# Patient Record
Sex: FEMALE | Race: BLACK OR AFRICAN AMERICAN | Employment: UNEMPLOYED | URBAN - METROPOLITAN AREA
[De-identification: names, ages, dates, MRNs, and addresses within clinical notes are randomized per-mention and may not be internally consistent; named-entity substitution may affect disease eponyms.]

---

## 2018-09-20 ENCOUNTER — OFFICE VISIT (OUTPATIENT)
Dept: ORTHOPEDIC SURGERY | Facility: CLINIC | Age: 28
End: 2018-09-20

## 2018-09-20 VITALS
BODY MASS INDEX: 35.55 KG/M2 | HEART RATE: 69 BPM | OXYGEN SATURATION: 99 % | WEIGHT: 221.2 LBS | TEMPERATURE: 97.1 F | DIASTOLIC BLOOD PRESSURE: 66 MMHG | HEIGHT: 66 IN | SYSTOLIC BLOOD PRESSURE: 132 MMHG | RESPIRATION RATE: 16 BRPM

## 2018-09-20 DIAGNOSIS — M79.642 PAIN IN BOTH HANDS: ICD-10-CM

## 2018-09-20 DIAGNOSIS — M79.641 PAIN IN BOTH HANDS: ICD-10-CM

## 2018-09-20 DIAGNOSIS — M65.4 DE QUERVAIN'S TENOSYNOVITIS, LEFT: Primary | ICD-10-CM

## 2018-09-20 DIAGNOSIS — M65.4 DE QUERVAIN'S TENOSYNOVITIS, RIGHT: ICD-10-CM

## 2018-09-20 PROBLEM — E66.01 SEVERE OBESITY (BMI 35.0-39.9): Status: ACTIVE | Noted: 2018-09-20

## 2018-09-20 RX ORDER — BETAMETHASONE SODIUM PHOSPHATE AND BETAMETHASONE ACETATE 3; 3 MG/ML; MG/ML
6 INJECTION, SUSPENSION INTRA-ARTICULAR; INTRALESIONAL; INTRAMUSCULAR; SOFT TISSUE ONCE
Qty: 0.5 ML | Refills: 0
Start: 2018-09-20 | End: 2018-09-20

## 2018-09-20 RX ORDER — BUPIVACAINE HYDROCHLORIDE 2.5 MG/ML
0.5 INJECTION, SOLUTION EPIDURAL; INFILTRATION; INTRACAUDAL ONCE
Qty: 0.5 ML | Refills: 0
Start: 2018-09-20 | End: 2018-09-20

## 2018-09-20 NOTE — MR AVS SNAPSHOT
[address] [facility] 
 
 
 [address], [address] [facility] [address] 
[phone] Patient: Estefania Hernandez MRN: [id number] YWX: Visit Information Date & Time Provider Department Dept. Phone Encounter #  
 2018  2:30 PM Brian Durham MD [address] [facility] [id number] [id number] Follow-up Instructions Return if symptoms worsen or fail to improve. Your Appointments 2018  2:30 PM  
New Patient with Brian Durham MD  
[address] [facility] [facility] [address]) Appt Note: BILATERAL HAND PAIN NX [facility]/uses [employer] transportation needs 5 days); URGENT APPT (pt unable to hold child/); PT RS FOR THIS NEW DATE AND TIME  
 [address], [address] [facility] [address]  
[phone]  
  
   
 [address] [place], [place] [address] [id number] Upcoming Health Maintenance Date Due  
 HPV Age 9Y-34Y (1 of 1 - Female 3 Dose Series) 11/15/2003 DTaP/Tdap/Td series (1 - Tdap) 11/15/2013 PAP AKA CERVICAL CYTOLOGY 11/15/2013 Influenza Age 5 to Adult 2018 Allergies as of 2018  Review Complete On: 2018 By: Laila Piedra Not on File Current Immunizations  Never Reviewed No immunizations on file. Not reviewed this visit You Were Diagnosed With   
  
 Codes Comments De Quervain's tenosynovitis, left    -  Primary ICD-10-CM: M65.4 ICD-9-CM: 727.04 De Quervain's tenosynovitis, right     ICD-10-CM: M65.4 ICD-9-CM: 727.04 Pain in both hands     ICD-10-CM: M79.641, M68.054 ICD-9-CM: 729.5 Vitals BP Pulse Temp Resp Height(growth percentile) Weight(growth percentile) 132/66 69 97.1 °F (36.2 °C) (Oral) 16 5' 6\" (1.676 m) 221 lb 3.2 oz (100.3 kg) SpO2 BMI OB Status Smoking Status 99% 35.7 kg/m2 Having regular periods Never Smoker BMI and BSA Data Body Mass Index Body Surface Area 35.7 kg/m 2 2.16 m 2 Your Updated Medication List  
  
Notice  As of 9/20/2018  2:05 PM  
 You have not been prescribed any medications. Follow-up Instructions Return if symptoms worsen or fail to improve. Patient Instructions Jenn Villanueva Tenosynovitis: Care Instructions Your Care Instructions Jenn Villanueva (say \"Alyson\") tenosynovitis is a problem that makes the bottom of your thumb and the side of your wrist hurt. When you have de Quervain's, the ropey fiber (tendon) that helps move your thumb away from your fingers becomes swollen. You may have pain when you move your wrist or pick things up. You may hear a creaking sound when you move your wrist or thumb. Symptoms often get better in a few weeks with home care. Your doctor may want you to start some gentle stretching exercises once your symptoms are gone. Sometimes treatment with an injection or surgery is needed. Follow-up care is a key part of your treatment and safety. Be sure to make and go to all appointments, and call your doctor if you are having problems. It's also a good idea to know your test results and keep a list of the medicines you take. How can you care for yourself at home? · Until your symptoms are better, stop the activities that caused the pain. · Avoid moving the hand and wrist that hurt. · Follow your doctor's directions for wearing a splint to keep your thumb and wrist from moving. · Try ice or heat. ¨ Put ice or a cold pack on your thumb and wrist for 10 to 20 minutes at a time. Put a thin cloth between the ice and your skin. ¨ You can use heat for 20 to 30 minutes, 2 or 3 times a day. Try using a heating pad, hot shower, or hot pack. · Ask your doctor if you can take an over-the-counter pain medicine, such as acetaminophen (Tylenol), ibuprofen (Advil, Motrin), or naproxen (Aleve). Be safe with medicines. Read and follow all instructions on the label. When should you call for help? Watch closely for changes in your health, and be sure to contact your doctor if: 
  · You have new or worse pain.  
  · You have new or worse numbness or tingling in your hand or fingers.  
  · Your hand feels weaker.  
  · You do not get better as expected. Where can you learn more? Go to http://marii-yanira.info/. Enter U947 in the search box to learn more about \"De Quervain's Tenosynovitis: Care Instructions. \" Current as of: November 29, 2017 Content Version: 11.7 © 9792-2182 vushaper. Care instructions adapted under license by [employer] (which disclaims liability or warranty for this information). If you have questions about a medical condition or this instruction, always ask your healthcare professional. [address] any warranty or liability for your use of this information. Jagjit Nearing Disease: Exercises Your Care Instructions Here are some examples of typical rehabilitation exercises for your condition. Start each exercise slowly. Ease off the exercise if you start to have pain. Your doctor or your physical or occupational therapist will tell you when you can start these exercises and which ones will work best for you. How to do the exercises Thumb lifts 1. Place your hand on a flat surface, with your palm up. 2. Lift your thumb away from your palm to make a \"C\" shape. 3. Hold for about 6 seconds. 4. Repeat 8 to 12 times. Passive thumb MP flexion 1. Hold your hand in front of you, and turn your hand so your little finger faces down and your thumb faces up. (Your hand should be in the position used for shaking someone's hand.) You may also rest your hand on a flat surface. 2. Use the fingers on your other hand to bend your thumb down at the point where your thumb connects to your palm. 3. Hold for at least 15 to 30 seconds. 4. Repeat 2 to 4 times. Finkelstein stretch 1. Hold your arms out in front of you. (Your hand should be in the position used for shaking someone's hand.) 2. Bend your thumb toward your palm. 3. Use your other hand to gently stretch your thumb and wrist downward until you feel the stretch on the thumb side of your wrist. 
4. Hold for at least 15 to 30 seconds. 5. Repeat 2 to 4 times. Resisted ulnar deviation For this exercise, you will need elastic exercise material, such as surgical tubing or Thera-Band. 1. Sit leaning forward with your legs slightly spread and your elbow on your thigh. 2. Grasp one end of the band with your palm down, and step on the other end with the foot opposite the hand holding the band. 3. Slowly bend your wrist sideways and away from your knee. 4. Repeat 8 to 12 times. Follow-up care is a key part of your treatment and safety. Be sure to make and go to all appointments, and call your doctor if you are having problems. It's also a good idea to know your test results and keep a list of the medicines you take. Where can you learn more? Go to http://marii-yanira.info/. Enter F065 in the search box to learn more about \"De Quervain's Disease: Exercises. \" Current as of: November 29, 2017 Content Version: 11.7 © 3977-7134 [employer]. Care instructions adapted under license by [employer] (which disclaims liability or warranty for this information). If you have questions about a medical condition or this instruction, always ask your healthcare professional. [place] [address] any warranty or liability for your use of this information. Introducing [facility] & [facility]! Brittani Gaspar introduces CertificationPoint patient portal. Now you can access parts of your medical record, email your doctor's office, and request medication refills online. 1. In your internet browser, go to [email or web address] 2. Click on the First Time User? Click Here link in the Sign In box. You will see the New Member Sign Up page. 3. Enter your ClaimKit Access Code exactly as it appears below. You will not need to use this code after youve completed the sign-up process. If you do not sign up before the expiration date, you must request a new code. · ClaimKit Access Code: [email or web address] Expires: 12/19/2018  2:05 PM 
 
4. Enter the last four digits of your Social Security Number (xxxx) and Date of Birth (mm/dd/yyyy) as indicated and click Submit. You will be taken to the next sign-up page. 5. Create a ClaimKit ID. This will be your ClaimKit login ID and cannot be changed, so think of one that is secure and easy to remember. 6. Create a ClaimKit password. You can change your password at any time. 7. Enter your Password Reset Question and Answer. This can be used at a later time if you forget your password. 8. Enter your e-mail address. You will receive e-mail notification when new information is available in [address] 9. Click Sign Up. You can now view and download portions of your medical record. 10. Click the Download Summary menu link to download a portable copy of your medical information. If you have questions, please visit the Frequently Asked Questions section of the ClaimKit website. Remember, ClaimKit is NOT to be used for urgent needs. For medical emergencies, dial 911. Now available from your iPhone and Android! Please provide this summary of care documentation to your next provider. If you have any questions after today's visit, please call [phone].

## 2018-09-20 NOTE — PATIENT INSTRUCTIONS
Sheree Mays Tenosynovitis: Care Instructions Your Care Instructions Sheree Mays (say \"Alyson\") tenosynovitis is a problem that makes the bottom of your thumb and the side of your wrist hurt. When you have de Quervain's, the ropey fiber (tendon) that helps move your thumb away from your fingers becomes swollen. You may have pain when you move your wrist or pick things up. You may hear a creaking sound when you move your wrist or thumb. Symptoms often get better in a few weeks with home care. Your doctor may want you to start some gentle stretching exercises once your symptoms are gone. Sometimes treatment with an injection or surgery is needed. Follow-up care is a key part of your treatment and safety. Be sure to make and go to all appointments, and call your doctor if you are having problems. It's also a good idea to know your test results and keep a list of the medicines you take. How can you care for yourself at home? · Until your symptoms are better, stop the activities that caused the pain. · Avoid moving the hand and wrist that hurt. · Follow your doctor's directions for wearing a splint to keep your thumb and wrist from moving. · Try ice or heat. ¨ Put ice or a cold pack on your thumb and wrist for 10 to 20 minutes at a time. Put a thin cloth between the ice and your skin. ¨ You can use heat for 20 to 30 minutes, 2 or 3 times a day. Try using a heating pad, hot shower, or hot pack. · Ask your doctor if you can take an over-the-counter pain medicine, such as acetaminophen (Tylenol), ibuprofen (Advil, Motrin), or naproxen (Aleve). Be safe with medicines. Read and follow all instructions on the label. When should you call for help? Watch closely for changes in your health, and be sure to contact your doctor if: 
  · You have new or worse pain.  
  · You have new or worse numbness or tingling in your hand or fingers.  
  · Your hand feels weaker.   · You do not get better as expected. Where can you learn more? Go to http://marii-yanira.info/. Enter S862 in the search box to learn more about \"De Quervain's Tenosynovitis: Care Instructions. \" Current as of: November 29, 2017 Content Version: 11.7 © 1545-8489 Scards. Care instructions adapted under license by [employer] (which disclaims liability or warranty for this information). If you have questions about a medical condition or this instruction, always ask your healthcare professional. [address] any warranty or liability for your use of this information. Thelma Mullins Disease: Exercises Your Care Instructions Here are some examples of typical rehabilitation exercises for your condition. Start each exercise slowly. Ease off the exercise if you start to have pain. Your doctor or your physical or occupational therapist will tell you when you can start these exercises and which ones will work best for you. How to do the exercises Thumb lifts 1. Place your hand on a flat surface, with your palm up. 2. Lift your thumb away from your palm to make a \"C\" shape. 3. Hold for about 6 seconds. 4. Repeat 8 to 12 times. Passive thumb MP flexion 1. Hold your hand in front of you, and turn your hand so your little finger faces down and your thumb faces up. (Your hand should be in the position used for shaking someone's hand.) You may also rest your hand on a flat surface. 2. Use the fingers on your other hand to bend your thumb down at the point where your thumb connects to your palm. 3. Hold for at least 15 to 30 seconds. 4. Repeat 2 to 4 times. Finkelstein stretch 1. Hold your arms out in front of you. (Your hand should be in the position used for shaking someone's hand.) 2. Bend your thumb toward your palm.  
3. Use your other hand to gently stretch your thumb and wrist downward until you feel the stretch on the thumb side of your wrist. 
4. Hold for at least 15 to 30 seconds. 5. Repeat 2 to 4 times. Resisted ulnar deviation For this exercise, you will need elastic exercise material, such as surgical tubing or Thera-Band. 1. Sit leaning forward with your legs slightly spread and your elbow on your thigh. 2. Grasp one end of the band with your palm down, and step on the other end with the foot opposite the hand holding the band. 3. Slowly bend your wrist sideways and away from your knee. 4. Repeat 8 to 12 times. Follow-up care is a key part of your treatment and safety. Be sure to make and go to all appointments, and call your doctor if you are having problems. It's also a good idea to know your test results and keep a list of the medicines you take. Where can you learn more? Go to http://marii-yanira.info/. Enter V023 in the search box to learn more about \"De Quervain's Disease: Exercises. \" Current as of: November 29, 2017 Content Version: 11.7 © 0721-3658 [employer]Page, Incorporated. Care instructions adapted under license by [employer] (which disclaims liability or warranty for this information). If you have questions about a medical condition or this instruction, always ask your healthcare professional. [address] any warranty or liability for your use of this information.

## 2018-09-20 NOTE — PROGRESS NOTES
Patient: Samara Urias                MRN: [id number]       SSN: [id number] YOB: 1990        AGE: 24 y.o. SEX: female PCP: No primary care provider on file. 09/20/18 Chief Complaint Patient presents with  
 Hand Pain RANDI HAND PAIN  
 
HISTORY:  Samara Urias is a 24 y.o. female who is seen for bilateral hand pain, numbness, and tingling. She reports a pulling sensation over her first extensor compartments of both of her hands. She reports that she recently delivered a baby. She believed that the nurses who put in her IV while she was in the hospital delivering her baby dug into her wrists. She reports she has been picking up the child a lot and using her hands more often recently. She reports difficulty with doing everyday activities due to her hand pains. She reports she has been pushing with her elbows instead of her hands due to her pains. She has a feeling of weakness in her left arm. She reports no h/o injury. She reports her pain started since she delivered her baby 5 months ago. Pain Assessment  9/20/2018 Location of Pain Hand;Wrist  
Location Modifiers Left;Right Severity of Pain 10 Quality of Pain Sharp; Aching; Throbbing Quality of Pain Comment PT STATES SHE HAS A PULLING FEELING & WEAKNESS IN ARMS Duration of Pain Persistent Frequency of Pain Constant Aggravating Factors Other (Comment) Aggravating Factors Comment MOVEMENTS OF FINGERS, CERTAIN MOVEMENTS, PICKING UP BABY, WHILE AT WORK Limiting Behavior Yes Relieving Factors Nothing Result of Injury Yes Work-Related Injury No  
Type of Injury Other (Comment) Type of Injury Comment R/T IV INSERTION DURING GIVING CHILD BIRTH Occupation, etc:  Ms. Tramaine Pate is a  at [employer]. She moved to  from [place] just over 1 year ago. She has an associates degree in Traction and is thinking of going back for her bachelor's degree.   She has a baby daughter and a 7yo son at home. Current weight is 221 pounds. She is 5'6\" tall. No results found for: HBA1C, HGBE8, ZCU7LXPC, IFT6AYEB, VHJ3OJOI Weight Metrics 9/20/2018 Weight 221 lb 3.2 oz  
BMI 35.7 kg/m2 There is no problem list on file for this patient. REVIEW OF SYSTEMS: All Below are Negative except: See HPI Constitutional: negative for fever, chills, and weight loss. Cardiovascular: negative for chest pain, claudication, leg swelling, SOB, SNELL Gastrointestinal: Negative for pain, N/V/C/D, Blood in stool or urine, dysuria,  hematuria, incontinence, pelvic pain. Musculoskeletal: See HPI Neurological: Negative for dizziness and weakness. Negative for headaches, Visual changes, confusion, seizures Phychiatric/Behavioral: Negative for depression, memory loss, substance  abuse. Extremities: Negative for hair changes, rash, or skin lesion changes. Hematologic: Negative for bleeding problems, bruising, pallor or swollen lymph  nodes Peripheral Vascular: No calf pain, no circulation deficits. Social History Social History  Marital status: SINGLE Spouse name: N/A  
 Number of children: N/A  
 Years of education: N/A Occupational History  Not on file. Social History Main Topics  Smoking status: Never Smoker  Smokeless tobacco: Never Used  Alcohol use Not on file  Drug use: Not on file  Sexual activity: Not on file Other Topics Concern  Not on file Social History Narrative  No narrative on file Not on File No current outpatient prescriptions on file. No current facility-administered medications for this visit. PHYSICAL EXAMINATION: 
Visit Vitals  /66  Pulse 69  Temp 97.1 °F (36.2 °C) (Oral)  Resp 16  
 Ht 5' 6\" (1.676 m)  Wt 221 lb 3.2 oz (100.3 kg)  SpO2 99%  BMI 35.7 kg/m2 ORTHO EXAMINATION: 
Examination Right Wrist Left Wrist  
Skin Intact Intact Tenderness + radial + radial  
Flexion 30 30 Extension 30 30 Deformity - - Effusion - - Finger flexion Full Full Finger extension Full Full Tinel's sign - - Phalen's test - - Finklestein maneuver + + Pain with thumb abduction + + Capillary refill - - PROCEDURE:  After discussing treatment options, patient's left first extensor compartment was injected with 1/2 cc Marcaine and 1/2 cc Celestone. Chart reviewed for the following: 
 Jerrod Tsai MD, have reviewed the History, Physical and updated the Allergic reactions for [facility] TIME OUT performed immediately prior to start of procedure: 
Jerrod Tsai MD, have performed the following reviews on [facility] prior to the start of the procedure: 
         
* Patient was identified by name and date of birth * Agreement on procedure being performed was verified * Risks and Benefits explained to the patient * Procedure site verified and marked as necessary * Patient was positioned for comfort * Consent was obtained Time: 1:59 PM  
 
Date of procedure: 9/20/2018 Procedure performed by:  Anna Marie Ricci MD 
 
Ms. Shankar tolerated the procedure well with no complications. IMPRESSION:   
  ICD-10-CM ICD-9-CM 1. De Quervain's tenosynovitis, left M65.4 727.04   
2. De Quervain's tenosynovitis, right M65.4 727.04   
3. Pain in both hands M79.641 729.5   
 M79.642 PLAN:   After discussing treatment options, patient's left first extensor compartment was injected with 1/2 cc Marcaine and 1/2 cc Celestone. Left thumb spica splint applied. She will follow up as needed. We discussed possible need for De Quervain's release at some time in the future if pain continues.  
 
Scribed by Marie Cee ([facility]) as dictated by Anna Marie Ricci MD

## 2019-01-15 ENCOUNTER — OFFICE VISIT (OUTPATIENT)
Dept: ORTHOPEDIC SURGERY | Age: 29
End: 2019-01-15

## 2019-01-15 VITALS
RESPIRATION RATE: 16 BRPM | TEMPERATURE: 98.2 F | DIASTOLIC BLOOD PRESSURE: 64 MMHG | BODY MASS INDEX: 37.93 KG/M2 | WEIGHT: 236 LBS | OXYGEN SATURATION: 99 % | SYSTOLIC BLOOD PRESSURE: 132 MMHG | HEART RATE: 80 BPM | HEIGHT: 66 IN

## 2019-01-15 DIAGNOSIS — M65.4 DE QUERVAIN'S TENOSYNOVITIS, RIGHT: ICD-10-CM

## 2019-01-15 DIAGNOSIS — M79.641 PAIN IN BOTH HANDS: ICD-10-CM

## 2019-01-15 DIAGNOSIS — M65.4 DE QUERVAIN'S TENOSYNOVITIS, LEFT: Primary | ICD-10-CM

## 2019-01-15 DIAGNOSIS — M79.642 PAIN IN BOTH HANDS: ICD-10-CM

## 2019-01-15 RX ORDER — BUPIVACAINE HYDROCHLORIDE 2.5 MG/ML
0.5 INJECTION, SOLUTION EPIDURAL; INFILTRATION; INTRACAUDAL ONCE
Qty: 0.5 ML | Refills: 0
Start: 2019-01-15 | End: 2019-01-15

## 2019-01-15 RX ORDER — BETAMETHASONE SODIUM PHOSPHATE AND BETAMETHASONE ACETATE 3; 3 MG/ML; MG/ML
6 INJECTION, SUSPENSION INTRA-ARTICULAR; INTRALESIONAL; INTRAMUSCULAR; SOFT TISSUE ONCE
Qty: 0.5 ML | Refills: 0
Start: 2019-01-15 | End: 2019-01-15

## 2019-01-15 NOTE — PATIENT INSTRUCTIONS
Tacos Snow Disease: Exercises Your Care Instructions Here are some examples of typical rehabilitation exercises for your condition. Start each exercise slowly. Ease off the exercise if you start to have pain. Your doctor or your physical or occupational therapist will tell you when you can start these exercises and which ones will work best for you. How to do the exercises Thumb lifts 1. Place your hand on a flat surface, with your palm up. 2. Lift your thumb away from your palm to make a \"C\" shape. 3. Hold for about 6 seconds. 4. Repeat 8 to 12 times. Passive thumb MP flexion 1. Hold your hand in front of you, and turn your hand so your little finger faces down and your thumb faces up. (Your hand should be in the position used for shaking someone's hand.) You may also rest your hand on a flat surface. 2. Use the fingers on your other hand to bend your thumb down at the point where your thumb connects to your palm. 3. Hold for at least 15 to 30 seconds. 4. Repeat 2 to 4 times. Finkelstein stretch 1. Hold your arms out in front of you. (Your hand should be in the position used for shaking someone's hand.) 2. Bend your thumb toward your palm. 3. Use your other hand to gently stretch your thumb and wrist downward until you feel the stretch on the thumb side of your wrist. 
4. Hold for at least 15 to 30 seconds. 5. Repeat 2 to 4 times. Resisted ulnar deviation 1. Sit leaning forward with your legs slightly spread and your elbow on your thigh. 2. Grasp one end of the band with your palm down, and step on the other end with the foot opposite the hand holding the band. 3. Slowly bend your wrist sideways and away from your knee. 4. Repeat 8 to 12 times. Follow-up care is a key part of your treatment and safety. Be sure to make and go to all appointments, and call your doctor if you are having problems.  It's also a good idea to know your test results and keep a list of the medicines you take. Where can you learn more? Go to http://marii-yanira.info/. Enter H702 in the search box to learn more about \"De Quervain's Disease: Exercises. \" Current as of: November 29, 2017 Content Version: 11.8 © 6701-5448 [employer]. Care instructions adapted under license by [employer] (which disclaims liability or warranty for this information). If you have questions about a medical condition or this instruction, always ask your healthcare professional. [address] any warranty or liability for your use of this information. Andres Ax Tendon Release: Before Your Surgery What is de Quervain's tendon release? De Quervain's tendon release is surgery to decrease pressure on a tendon that runs along the side of the wrist near the thumb. Tendons are flexible, ropelike fibers that connect muscle to bone. In de Quervain's (say \"ztu-aqms-HXPB\") tendinitis, the tendon becomes swollen. This causes the tendon to rub painfully against the tissue that covers it. This surgery will probably be done while you are awake. The doctor will give you a shot (injection) to numb your hand and prevent pain. You also may get medicine to help you relax. The doctor will make a cut (incision) in the skin on the side of your wrist near the base of your thumb. He or she will make a cut to open the tight band over the swollen part of the tendon. This will allow the tendon to move freely without pain. The doctor will close the skin incision with stitches. You will have a scar on the side of your wrist that will fade with time. You will go home on the same day as the surgery. How soon you can return to work depends on your job. If you can do your job without using your hand, you may be able to go back in 1 or 2 days.  But if your job requires you to do repeated hand or wrist movements, put pressure on your hand, or lift things, you may need 6 to 12 weeks off work. Follow-up care is a key part of your treatment and safety. Be sure to make and go to all appointments, and call your doctor if you are having problems. It's also a good idea to know your test results and keep a list of the medicines you take. What happens before surgery? 
 Surgery can be stressful. This information will help you understand what you can expect. And it will help you safely prepare for surgery. 
 Preparing for surgery 
  · Understand exactly what surgery is planned, along with the risks, benefits, and other options. · Tell your doctors ALL the medicines, vitamins, supplements, and herbal remedies you take. Some of these can increase the risk of bleeding or interact with anesthesia.  
  · If you take blood thinners, such as warfarin (Coumadin), clopidogrel (Plavix), or aspirin, be sure to talk to your doctor. He or she will tell you if you should stop taking these medicines before your surgery. Make sure that you understand exactly what your doctor wants you to do.  
  · Your doctor will tell you which medicines to take or stop before your surgery. You may need to stop taking certain medicines a week or more before surgery. So talk to your doctor as soon as you can.  
  · If you have an advance directive, let your doctor know. It may include a living will and a durable power of  for health care. Bring a copy to the hospital. If you don't have one, you may want to prepare one. It lets your doctor and loved ones know your health care wishes. Doctors advise that everyone prepare these papers before any type of surgery or procedure. What happens on the day of surgery? · Follow the instructions exactly about when to stop eating and drinking. If you don't, your surgery may be canceled. If your doctor told you to take your medicines on the day of surgery, take them with only a sip of water.   · Take a bath or shower before you come in for your surgery. Do not apply lotions, perfumes, deodorants, or nail polish.  
  · Do not shave the surgical site yourself.  
  · Take off all jewelry and piercings. And take out contact lenses, if you wear them.  
 At the hospital or surgery center · Bring a picture ID.  
  · The area for surgery is often marked to make sure there are no errors.  
  · You will be kept comfortable and safe by your anesthesia provider. You may get medicine that relaxes you or puts you in a light sleep. The area being worked on will be numb.  
  · The surgery will take about 30 minutes.  
  · After surgery, you will have a thick bandage on your hand and wrist. You may not be able to bend your wrist.  
  · You will probably go home after 1 hour in the recovery room. Going home · Be sure you have someone to drive you home. Anesthesia and pain medicine make it unsafe for you to drive.  
  · You will be given more specific instructions about recovering from your surgery. They will cover things like diet, wound care, follow-up care, driving, and getting back to your normal routine. When should you call your doctor? · You have questions or concerns.  
  · You don't understand how to prepare for your surgery.  
  · You become ill before the surgery (such as fever, flu, or a cold).  
  · You need to reschedule or have changed your mind about having the surgery. Where can you learn more? Go to http://marii-yanira.info/. Enter C153 in the search box to learn more about \"De Quervain's Tendon Release: Before Your Surgery. \" Current as of: November 29, 2017 Content Version: 11.8 © 8514-7078 [employer]. Care instructions adapted under license by [employer] (which disclaims liability or warranty for this information).  If you have questions about a medical condition or this instruction, always ask your healthcare professional. [employer] disclaims any warranty or liability for your use of this information. Elsa Reyes Tenosynovitis: Care Instructions Your Care Instructions Elsa Reyes (say \"Alyson\") tenosynovitis is a problem that makes the bottom of your thumb and the side of your wrist hurt. When you have de Quervain's, the ropey fiber (tendon) that helps move your thumb away from your fingers becomes swollen. You may have pain when you move your wrist or pick things up. You may hear a creaking sound when you move your wrist or thumb. Symptoms often get better in a few weeks with home care. Your doctor may want you to start some gentle stretching exercises once your symptoms are gone. Sometimes treatment with an injection or surgery is needed. Follow-up care is a key part of your treatment and safety. Be sure to make and go to all appointments, and call your doctor if you are having problems. It's also a good idea to know your test results and keep a list of the medicines you take. How can you care for yourself at home? · Until your symptoms are better, stop the activities that caused the pain. · Avoid moving the hand and wrist that hurt. · Follow your doctor's directions for wearing a splint to keep your thumb and wrist from moving. · Try ice or heat. ? Put ice or a cold pack on your thumb and wrist for 10 to 20 minutes at a time. Put a thin cloth between the ice and your skin. ? You can use heat for 20 to 30 minutes, 2 or 3 times a day. Try using a heating pad, hot shower, or hot pack. · Ask your doctor if you can take an over-the-counter pain medicine, such as acetaminophen (Tylenol), ibuprofen (Advil, Motrin), or naproxen (Aleve). Be safe with medicines. Read and follow all instructions on the label. When should you call for help? Watch closely for changes in your health, and be sure to contact your doctor if: 
  · You have new or worse pain.   · You have new or worse numbness or tingling in your hand or fingers.  
  · Your hand feels weaker.  
  · You do not get better as expected. Where can you learn more? Go to http://marii-yanira.info/. Enter J047 in the search box to learn more about \"De Quervain's Tenosynovitis: Care Instructions. \" Current as of: November 29, 2017 Content Version: 11.8 © 1848-8286 [employer]. Care instructions adapted under license by [employer] (which disclaims liability or warranty for this information). If you have questions about a medical condition or this instruction, always ask your healthcare professional. [address] any warranty or liability for your use of this information.

## 2019-01-15 NOTE — PROGRESS NOTES
Patient: Jaquelin Villalta                MRN: [id number]       SSN: [id number] YOB: 1990        AGE: 34 y.o. SEX: female PCP: No primary care provider on file. 01/15/19 Chief Complaint Patient presents with  
 Hand Pain  
  praful hand pain follow up HISTORY:  Jaquelin Villalta is a 34 y.o. female who is seen for bilateral radial wrist pains. She reports a pulling sensation over her first extensor compartments of both wrists. She reports that she recently delivered a baby. She states that the nurses who inserted her IV during delivery dug in too hard. She notes pain when picking up her baby son. She reports difficulty with doing everyday activities due to her wrist pains. She reports she has been pushing with her elbows instead of her hands due to her pains. She has a feeling of weakness especially in her left wrist and forearm. She reports no h/o injury. She feels as if her thumbs will pop out of place and get stuck. She has to pop back her thumbs back into place. She states that her pain feels as if someone is stabbing her. She has a shooting pain through her arm when her thumb pops. Pain Assessment  1/15/2019 Location of Pain Hand Location Modifiers Right Severity of Pain 9 Quality of Pain Aching Quality of Pain Comment - Duration of Pain Persistent Frequency of Pain Constant Aggravating Factors Bending;Stretching;Straightening Aggravating Factors Comment - Limiting Behavior Some Relieving Factors Rest  
Result of Injury - Work-Related Injury - Type of Injury - Type of Injury Comment -  
 
Occupation, etc:  Ms. Mary Anne Hopkins is a  at [employer]. She moved to  from [place] just over 1 year ago. She has an associates degree in [place] and is thinking of going back for her bachelor's degree. She has a 7mo son Brissa Arizmendi and 6yo son Donna Srinivasan at home. Current weight is 236 pounds. She is 5'6\" tall. No results found for: HBA1C, HGBE8, SEH5OSZW, ANF9AMOB, IEW6QTEM Weight Metrics 1/15/2019 9/20/2018 Weight 236 lb 221 lb 3.2 oz  
BMI 38.09 kg/m2 35.7 kg/m2 Patient Active Problem List  
Diagnosis Code  Severe obesity (BMI 35.0-39. 9) E66.01  
 
REVIEW OF SYSTEMS: All Below are Negative except: See HPI Constitutional: negative for fever, chills, and weight loss. Cardiovascular: negative for chest pain, claudication, leg swelling, SOB, SNELL Gastrointestinal: Negative for pain, N/V/C/D, Blood in stool or urine, dysuria,  hematuria, incontinence, pelvic pain. Musculoskeletal: See HPI Neurological: Negative for dizziness and weakness. Negative for headaches, Visual changes, confusion, seizures Phychiatric/Behavioral: Negative for depression, memory loss, substance  abuse. Extremities: Negative for hair changes, rash, or skin lesion changes. Hematologic: Negative for bleeding problems, bruising, pallor or swollen lymph  nodes Peripheral Vascular: No calf pain, no circulation deficits. Social History Socioeconomic History  Marital status: SINGLE Spouse name: Not on file  Number of children: Not on file  Years of education: Not on file  Highest education level: Not on file Social Needs  Financial resource strain: Not on file  Food insecurity - worry: Not on file  Food insecurity - inability: Not on file  Transportation needs - medical: Not on file  Transportation needs - non-medical: Not on file Occupational History  Not on file Tobacco Use  Smoking status: Never Smoker  Smokeless tobacco: Never Used Substance and Sexual Activity  Alcohol use: Not on file  Drug use: Not on file  Sexual activity: Not on file Other Topics Concern  Not on file Social History Narrative  Not on file No Known Allergies Current Outpatient Medications Medication Sig  
 betamethasone (CELESTONE SOLUSPAN) 6 mg/mL injection 1 mL by Intra artICUlar route once for 1 dose.  bupivacaine, PF, (MARCAINE, PF,) 0.25 % (2.5 mg/mL) injection 0.5 mL by Intra artICUlar route once for 1 dose.  betamethasone (CELESTONE SOLUSPAN) 6 mg/mL injection 1 mL by Intra artICUlar route once for 1 dose.  bupivacaine, PF, (MARCAINE, PF,) 0.25 % (2.5 mg/mL) injection 0.5 mL by Intra artICUlar route once for 1 dose. No current facility-administered medications for this visit. PHYSICAL EXAMINATION: 
Visit Vitals /64 Pulse 80 Temp 98.2 °F (36.8 °C) (Oral) Resp 16 Ht 5' 6\" (1.676 m) Wt 236 lb (107 kg) SpO2 99% BMI 38.09 kg/m² Appearance: Alert, well appearing and pleasant patient who is in no distress, oriented to person, place/time, and who follows commands. HEENT: [address] [place] [address] hears well, does not require hearing aids. Her sclera of the eyes are non-icteric. She is breathing normally and no respiratory accessory muscle use is noted. No JVD present and Neck ROM within normal limits. Psychiatric: Affect and mood are appropriate. Oriented x3 Heart[de-identified]  S1, S2 without murmer, regular rhythm Lungs:  Breath sounds clear to auscultation Cardiovascular/Peripheral Vascular: Normal pulses to each foot. Integumentary: No rashes,  wounds, or abrasions. Warm and normal color. No drainage. Gait: normal 
Sensory Exam: slightly decreased sensation to light touch in the median nerve distribution bilateral hands Lymphatic: No evidence of Lymphedema Vascular:      
Pulses: palpable Varicosities none Wounds/Abrasion: None Present Neuro: Negative, no tremors ORTHO EXAMINATION: 
Examination Right Wrist Left Wrist  
Skin Intact Intact Tenderness + radial + radial  
Flexion 30 30 Extension 30 30 Deformity - - Effusion - - Finger flexion Full Full Finger extension Full Full Tinel's sign - - Phalen's test - - Finklestein maneuver + + Pain with thumb abduction + + Capillary refill - -  
 
 PROCEDURE:  After discussing treatment options, patient's first extensor compartments were injected with 1/2 cc Marcaine and 1/2 cc Celestone. Chart reviewed for the following: 
 Vianca Stout MD, have reviewed the History, Physical and updated the Allergic reactions for Arturo Sharp TIME OUT performed immediately prior to start of procedure: 
Vianca Stout MD, have performed the following reviews on Lulu Shankar prior to the start of the procedure: 
         
* Patient was identified by name and date of birth * Agreement on procedure being performed was verified * Risks and Benefits explained to the patient * Procedure site verified and marked as necessary * Patient was positioned for comfort * Consent was obtained Time: 1:29 PM  
 
Date of procedure: 1/15/2019 Procedure performed by:  Bushra Albert MD 
 
Ms. Shankar tolerated the procedure well with no complications. IMPRESSION:   
  ICD-10-CM ICD-9-CM 1. De Quervain's tenosynovitis, left M65.4 727.04 betamethasone (CELESTONE SOLUSPAN) 6 mg/mL injection BETAMETHASONE ACETATE & SODIUM PHOSPHATE INJECTION 3 MG EA.  
   bupivacaine, PF, (MARCAINE, PF,) 0.25 % (2.5 mg/mL) injection ND INJECT TENDON SHEATH/LIGAMENT 2. De Quervain's tenosynovitis, right M65.4 727.04 betamethasone (CELESTONE SOLUSPAN) 6 mg/mL injection BETAMETHASONE ACETATE & SODIUM PHOSPHATE INJECTION 3 MG EA.  
   bupivacaine, PF, (MARCAINE, PF,) 0.25 % (2.5 mg/mL) injection ND INJECT TENDON SHEATH/LIGAMENT 3. Pain in both hands M79.641 729.5   
 M79.642 PLAN:   After discussing treatment options, patient's first extensor compartments were injected with 1/2 cc Marcaine and 1/2 cc Celestone. She will be scheduled for left De Quervain's release. Risks of surgery outlined and informed consent obtained. She will follow up for H&P.  
 
Scribed by Merlinda Means ([address]) as dictated by Bushra Albert MD

## 2019-01-22 DIAGNOSIS — Z01.812 BLOOD TESTS PRIOR TO TREATMENT OR PROCEDURE: ICD-10-CM

## 2019-01-22 DIAGNOSIS — Z01.810 PRE-OPERATIVE CARDIOVASCULAR EXAMINATION: ICD-10-CM

## 2019-01-22 DIAGNOSIS — M65.4 RADIAL STYLOID TENOSYNOVITIS: ICD-10-CM

## 2019-01-22 DIAGNOSIS — M65.4 DE QUERVAIN'S TENOSYNOVITIS, LEFT: Primary | ICD-10-CM

## 2019-01-22 DIAGNOSIS — Z01.811 PRE-OPERATIVE RESPIRATORY EXAMINATION: ICD-10-CM

## 2019-03-15 ENCOUNTER — HOSPITAL ENCOUNTER (OUTPATIENT)
Dept: LAB | Age: 29
Discharge: HOME | End: 2019-03-15

## 2019-03-15 ENCOUNTER — HOSPITAL ENCOUNTER (OUTPATIENT)
Dept: LAB | Age: 29
Discharge: HOME | End: 2019-03-15
Payer: MEDICAID

## 2019-03-15 ENCOUNTER — HOSPITAL ENCOUNTER (OUTPATIENT)
Dept: GENERAL RADIOLOGY | Age: 29
Discharge: HOME | End: 2019-03-15
Payer: MEDICAID

## 2019-03-15 DIAGNOSIS — M65.4 DE QUERVAIN'S TENOSYNOVITIS, LEFT: ICD-10-CM

## 2019-03-15 DIAGNOSIS — Z01.810 PRE-OPERATIVE CARDIOVASCULAR EXAMINATION: ICD-10-CM

## 2019-03-15 DIAGNOSIS — Z01.811 PRE-OPERATIVE RESPIRATORY EXAMINATION: ICD-10-CM

## 2019-03-15 LAB
ATRIAL RATE: 74 BPM
CALCULATED P AXIS, ECG09: 70 DEGREES
CALCULATED R AXIS, ECG10: 52 DEGREES
CALCULATED T AXIS, ECG11: 27 DEGREES
DIAGNOSIS, 93000: NORMAL
P-R INTERVAL, ECG05: 174 MS
Q-T INTERVAL, ECG07: 398 MS
QRS DURATION, ECG06: 90 MS
QTC CALCULATION (BEZET), ECG08: 441 MS
VENTRICULAR RATE, ECG03: 74 BPM
XX-LABCORP SPECIMEN COL,LCBCF: NORMAL

## 2019-03-15 PROCEDURE — 93005 ELECTROCARDIOGRAM TRACING: CPT

## 2019-03-15 PROCEDURE — 71046 X-RAY EXAM CHEST 2 VIEWS: CPT

## 2019-03-15 PROCEDURE — 99001 SPECIMEN HANDLING PT-LAB: CPT

## 2019-03-19 ENCOUNTER — OFFICE VISIT (OUTPATIENT)
Dept: ORTHOPEDIC SURGERY | Facility: CLINIC | Age: 29
End: 2019-03-19

## 2019-03-19 VITALS
BODY MASS INDEX: 39.44 KG/M2 | RESPIRATION RATE: 18 BRPM | WEIGHT: 245.4 LBS | TEMPERATURE: 97.7 F | OXYGEN SATURATION: 100 % | HEART RATE: 77 BPM | HEIGHT: 66 IN | SYSTOLIC BLOOD PRESSURE: 135 MMHG | DIASTOLIC BLOOD PRESSURE: 70 MMHG

## 2019-03-19 DIAGNOSIS — G89.18 ACUTE POST-OPERATIVE PAIN: Primary | ICD-10-CM

## 2019-03-19 DIAGNOSIS — Z01.818 PREOP GENERAL PHYSICAL EXAM: ICD-10-CM

## 2019-03-19 DIAGNOSIS — M65.4 DE QUERVAIN'S TENOSYNOVITIS, LEFT: ICD-10-CM

## 2019-03-19 RX ORDER — HYDROCODONE BITARTRATE AND ACETAMINOPHEN 7.5; 325 MG/1; MG/1
1 TABLET ORAL
Qty: 21 TAB | Refills: 0 | Status: SHIPPED | OUTPATIENT
Start: 2019-03-19 | End: 2019-03-26

## 2019-03-19 NOTE — PROGRESS NOTES
Daphney Lopez returns for History and Physical in preparation of upcoming left hand open DeQuervain's release. Please see the attached forms in Epic for History, Physical, and Review of systems.

## 2019-03-19 NOTE — H&P
History and Physical    34 year CC female seen in preparation for left wrist radial styloid teno synovitis release     Review of Systems   Constitutional: Positive for activity change (decreased use left wrist secondary to radial styloid pain). Negative for chills and fatigue. HENT: Negative for ear pain. Eyes: Negative for pain. Respiratory: Negative for shortness of breath. Cardiovascular: Negative. Gastrointestinal: Negative for nausea and vomiting. Endocrine: Negative. Genitourinary: Negative for flank pain. Musculoskeletal: Negative. Skin: Negative. Allergic/Immunologic: Negative. Neurological: Negative. Hematological: Negative. Psychiatric/Behavioral: Negative. Physical Exam   Nursing note and vitals reviewed. Constitutional: She is oriented to person, place, and time. She appears well-developed and well-nourished. HENT:   Head: Normocephalic and atraumatic. Eyes: Pupils are equal, round, and reactive to light. Neck: Normal range of motion. Cardiovascular: Normal rate, regular rhythm, normal heart sounds and intact distal pulses. Pulmonary/Chest: Effort normal and breath sounds normal.   Musculoskeletal:        Left hand: She exhibits decreased range of motion. Hands:  Neurological: She is alert and oriented to person, place, and time. Skin: Skin is warm, dry and intact. Psychiatric: She has a normal mood and affect. Her speech is normal and behavior is normal. Judgment and thought content normal.     left wrist radial styloid teno synovitis      left wrist radial styloid teno synovitis release     Risk / Benefit: Surgeon will discuss in \"face to face\" encounter on day of surgery. Family History and Surgical History reviewed, discussed in detail, and deemed Non-Contributory in preparation for this surgical encounter.

## 2019-03-29 ENCOUNTER — OFFICE VISIT (OUTPATIENT)
Dept: ORTHOPEDIC SURGERY | Facility: CLINIC | Age: 29
End: 2019-03-29

## 2019-03-29 VITALS
TEMPERATURE: 98 F | RESPIRATION RATE: 18 BRPM | OXYGEN SATURATION: 100 % | HEART RATE: 61 BPM | WEIGHT: 242.8 LBS | HEIGHT: 66 IN | DIASTOLIC BLOOD PRESSURE: 54 MMHG | SYSTOLIC BLOOD PRESSURE: 124 MMHG | BODY MASS INDEX: 39.02 KG/M2

## 2019-03-29 DIAGNOSIS — Z98.890 STATUS POST DE QUERVAIN'S RELEASE SURGERY: ICD-10-CM

## 2019-03-29 DIAGNOSIS — Z48.01 DRESSING CHANGE OR REMOVAL, SURGICAL WOUND: ICD-10-CM

## 2019-03-29 DIAGNOSIS — M65.4 DE QUERVAIN'S TENOSYNOVITIS, LEFT: Primary | ICD-10-CM

## 2019-03-29 DIAGNOSIS — Z48.89 ENCOUNTER FOR POSTOPERATIVE WOUND CHECK: ICD-10-CM

## 2019-03-29 RX ORDER — NORETHINDRONE ACETATE AND ETHINYL ESTRADIOL 1MG-20(21)
KIT ORAL
COMMUNITY

## 2019-03-29 RX ORDER — HYDROCODONE BITARTRATE AND ACETAMINOPHEN 5; 325 MG/1; MG/1
TABLET ORAL
COMMUNITY

## 2019-03-29 NOTE — PROGRESS NOTES
HISTORY OF PRESENT ILLNESS:  Alix Rich returns. She is now two days status post her left wrist open DeQuervain's release. She has pain at the surgical site. She is generally doing well, however, with her pain medication. PHYSICAL EXAM:  The bulky surgical dressing was removed over the radial styloid region of the left wrist and a transverse 2.5-centimeter surgical incision with interrupted vertical mattress sutures. The wound borders are well approximated. Soft tissue swelling bracketing the surgical site is present. There is no evidence of aristeo skin breakdown or infection. However, there is some surrounding erythema. The patients distal sensation associated with the left thumb is slightly decreased to light touch of the tuft and extending to the volar DIP. This is generally unchanged from immediately following surgery. PLAN:   A new sterile dressing was placed on the surgical site today. She was placed in a DeQuervain's thumb spica splint by Peter. She is going to remove this only for hygiene purposes. We will see her back in about one week for wound check and likely suture removal.  Today, all her questions were answered to her satisfaction. A copy of her OP report was unavailable for review or comment.

## 2019-04-08 ENCOUNTER — OFFICE VISIT (OUTPATIENT)
Dept: ORTHOPEDIC SURGERY | Facility: CLINIC | Age: 29
End: 2019-04-08

## 2019-04-08 VITALS
HEIGHT: 66 IN | TEMPERATURE: 97.4 F | DIASTOLIC BLOOD PRESSURE: 48 MMHG | RESPIRATION RATE: 16 BRPM | OXYGEN SATURATION: 100 % | HEART RATE: 67 BPM | SYSTOLIC BLOOD PRESSURE: 120 MMHG | BODY MASS INDEX: 38.89 KG/M2 | WEIGHT: 242 LBS

## 2019-04-08 DIAGNOSIS — Z98.890 STATUS POST DE QUERVAIN'S RELEASE SURGERY: ICD-10-CM

## 2019-04-08 DIAGNOSIS — Z48.02 ENCOUNTER FOR REMOVAL OF SUTURES: ICD-10-CM

## 2019-04-08 DIAGNOSIS — Z48.01 DRESSING CHANGE OR REMOVAL, SURGICAL WOUND: ICD-10-CM

## 2019-04-08 DIAGNOSIS — M79.642 HAND PAIN, LEFT: ICD-10-CM

## 2019-04-08 DIAGNOSIS — M65.4 DE QUERVAIN'S TENOSYNOVITIS, LEFT: Primary | ICD-10-CM

## 2019-04-08 NOTE — PROGRESS NOTES
1. Have you been to the ER, urgent care clinic since your last visit? Hospitalized since your last visit? NO    2. Have you seen or consulted any other health care providers outside of the [address] [place] [address] since your last visit? Include any pap smears or colon screening. Yes, pt cannot remember the doctor's name in [facility] family practice.

## 2019-04-08 NOTE — PROGRESS NOTES
HISTORY OF PRESENT ILLNESS:  Georgina Paige returns to the office following for DeQuervain's release of her left wrist.  The date of surgery was March 27, 2019. PHYSICAL EXAM:  Her surgical site is matured over the radial styloid region of the left wrist.     PROCEDURE:  Today, using clean technique, 5 interrupted vertical mattress sutures were removed with no complications. The wound borders were well approximated. There was trace bleeding at all of the suture removal sites. The bleeding was controlled with direct pressure, and a sterile Band-Aid was placed. PLAN:   The patient does have a Lynn-Mobley thumb spica splint she will continue to use 24/7 and may remove for hygiene for the next four to six weeks. No therapy is necessary at this time. She will follow back in about one month. Today, all her questions were answered to her satisfaction.

## 2019-05-06 ENCOUNTER — OFFICE VISIT (OUTPATIENT)
Dept: ORTHOPEDIC SURGERY | Facility: CLINIC | Age: 29
End: 2019-05-06

## 2019-05-06 VITALS
DIASTOLIC BLOOD PRESSURE: 68 MMHG | OXYGEN SATURATION: 100 % | TEMPERATURE: 98.7 F | HEIGHT: 66 IN | SYSTOLIC BLOOD PRESSURE: 133 MMHG | RESPIRATION RATE: 18 BRPM | HEART RATE: 67 BPM | BODY MASS INDEX: 38.12 KG/M2 | WEIGHT: 237.2 LBS

## 2019-05-06 DIAGNOSIS — Z98.890 STATUS POST DE QUERVAIN'S RELEASE SURGERY: ICD-10-CM

## 2019-05-06 DIAGNOSIS — M65.4 DE QUERVAIN'S TENOSYNOVITIS, LEFT: Primary | ICD-10-CM

## 2019-05-06 DIAGNOSIS — L91.0 KELOID SCAR OF SKIN: ICD-10-CM

## 2019-05-06 NOTE — PROGRESS NOTES
HISTORY OF PRESENT ILLNESS:  Danita Márquez returns five weeks status post her left wrist DeQuervain's release. She is doing fair today. She is wearing her Lynn-Mobley DeQuervain's supportive brace. Yesterday, when she was rushing outside to see what her mother wanted, she banged the splint against the doorknob. She had an immediate increase in discomfort associated with the surgical site. She had taken some Norco in the past, but it caused nausea even with food. Noting the pain that was created by the door handle incident yesterday, is asking for some other weaker pain medication. We discussed the opioid crisis at length, and I indicated that the only thing I could offer her was Tylenol or Motrin over-the-counter per the 's recommendations. PHYSICAL EXAM:  Examination of the surgical site reveals a transverse, well healed, surgical incision over the radial styloid region of the left wrist measuring 2 centimeters in length. There is keloid formed noted throughout. The patient is diffusely tender around the surgical site borders. The patient's left thumb has full passive range of motion without pain reproduced to the radial styloid region. PLAN:   In this instance, we are going to see her back on a prn basis. She may discontinue her Lynn-Mobley thumb DeQuervain's brace within the next week. If she feels she needs therapy at that time, she will advise us. However, I think she will be fine, generally, with her normal activities of daily living to recover without any difficulties. She does have DeQuervain's of the right hand and was recommended by Dr. Félix Bailon that at three months following completion of the left wrist surgery to complete the right wrist surgery. She will follow with Dr. Félix Bailon, therefore, sometime in the middle of June 2019 for scheduling.

## 2019-06-30 DIAGNOSIS — Z01.812 BLOOD TESTS PRIOR TO TREATMENT OR PROCEDURE: ICD-10-CM

## 2019-06-30 DIAGNOSIS — M65.4 DE QUERVAIN'S TENOSYNOVITIS, LEFT: ICD-10-CM
